# Patient Record
(demographics unavailable — no encounter records)

---

## 2024-10-21 NOTE — HISTORY OF PRESENT ILLNESS
[Lower back] : lower back [Left Leg] : left leg [Right Leg] : right leg [Work related] : work related [Frequent] : frequent [Household chores] : household chores [Work] : work [Sleep] : sleep [Meds] : meds [Extending back] : extending back [Walking] : walking [Full time] : Work status: full time [Home] : at home, [unfilled] , at the time of the visit. [Medical Office: ([facility])___] : at the medical office located in  [Verbal consent obtained from patient] : the patient, [unfilled] [] : no [FreeTextEntry1] : RT LEG GREATER THAN LT  [FreeTextEntry3] : 06/03/2010 [FreeTextEntry7] : RIGHT LEG [de-identified] : 1/2023

## 2024-11-20 NOTE — HISTORY OF PRESENT ILLNESS
[Lower back] : lower back [Work related] : work related [7] : 7 [Frequent] : frequent [Household chores] : household chores [Work] : work [Sleep] : sleep [Meds] : meds [Extending back] : extending back [Walking] : walking [Full time] : Work status: full time [Home] : at home, [unfilled] , at the time of the visit. [Medical Office: ([facility])___] : at the medical office located in  [Verbal consent obtained from patient] : the patient, [unfilled] [] : no [FreeTextEntry3] : 4/20/10 [FreeTextEntry7] : RIGHT LEG [de-identified] : 1/2023

## 2024-11-24 NOTE — ASSESSMENT
[FreeTextEntry1] : Interim history The patient is tolerating their medications without problems. There has no new pains, injuries, or complaints and no new issues. The use of medications appears appropriate and there are no aberrant behaviors noted. Side effects to current medications are denied. Average pain score for the month is 6 out of ten. The patient's current medications are documented to the best of their ability. The  was obtained and reviewed prior to the visit, and any discrepancies were discussed with the patient. Objective information Since the last visit there are no additional radiologic studies, labs, or pain complaints. There are no changes in the patient's physical status. Plan The patient was given refill of their medication at their current level and will return to the office as needed for follow-up. The patient is showing no aberrant behavior or evidence of diversion. Opioid contract and opioid risk assessment on chart.  reviewed and any discrepancy discussed with patent. Applicable urine toxicology reviewed and recorded in the patient's electronic record. Urine toxicology is ordered for patient per office protocol or patient's risk assessment.  Patient will follow-up in 1 month unless new issues arise the patient has returned earlier.  This note was generated by using Dragon medical dictation software.  A reasonable effort has been made for proofreading its contents, but typos may still remain.  If there are any questions or points of clarification needed, please notify my office.   None

## 2024-12-20 NOTE — HISTORY OF PRESENT ILLNESS
[Lower back] : lower back [Left Leg] : left leg [Right Leg] : right leg [Work related] : work related [Frequent] : frequent [Household chores] : household chores [Work] : work [Sleep] : sleep [Meds] : meds [Extending back] : extending back [Walking] : walking [Full time] : Work status: full time [] : no [FreeTextEntry1] : RT LEG GREATER THAN LT  [FreeTextEntry3] : 06/03/2010 [FreeTextEntry7] : RIGHT LEG [de-identified] : 1/2023

## 2025-01-17 NOTE — HISTORY OF PRESENT ILLNESS
[Lower back] : lower back [Work related] : work related [7] : 7 [Shooting] : shooting [Frequent] : frequent [Household chores] : household chores [Work] : work [Sleep] : sleep [Meds] : meds [Extending back] : extending back [Walking] : walking [Full time] : Work status: full time [Home] : at home, [unfilled] , at the time of the visit. [Medical Office: ([facility])___] : at the medical office located in  [Verbal consent obtained from patient] : the patient, [unfilled] [FreeTextEntry1] : HEMA MCKOY IS FOLLOWING UP FOR PAIN MED REFILL, PT ADL'S INCREASE WITH MEDICATION PT IS MORE ACTIVE IN GENERAL AND HAS IMRPOVED QUALITY OF LIFE. PT IS ABLE TO COMPLETE ADL     LAST UDS: 12.20.2024  PAIN LEVEL: 2-7/10 [] : no [FreeTextEntry3] : 4/20/10 [FreeTextEntry7] : RIGHT LEG [de-identified] : 1/2023

## 2025-02-21 NOTE — HISTORY OF PRESENT ILLNESS
[Home] : at home, [unfilled] , at the time of the visit. [Medical Office: ([facility])___] : at the medical office located in  [Telehealth (audio & video)] : This visit was provided via telehealth using real-time 2-way audio visual technology. [Verbal consent obtained from patient] : the patient, [unfilled] [FreeTextEntry1] : HEMA MCKOY IS FOLLOWING UP FOR PAIN MED REFILL, PT ADL'S INCREASE WITH MEDICATION PT IS MORE ACTIVE IN GENERAL AND HAS IMRPOVED QUALITY OF LIFE. PT IS ABLE TO COMPLETE ADL     LAST UDS: 12.20.2024  PAIN LEVEL: 2-7/10

## 2025-02-21 NOTE — ASSESSMENT
[FreeTextEntry1] : Interim history The patient is tolerating their medications without problems. There has no new pains, injuries, or complaints and no new issues. The use of medications appears appropriate and there are no aberrant behaviors noted. Side effects to current medications are denied. Average pain score for the month is 4 out of ten. The patient's current medications are documented to the best of their ability. The  was obtained and reviewed prior to the visit, and any discrepancies were discussed with the patient. Objective information Since the last visit there are no additional radiologic studies, labs, or pain complaints. There are no changes in the patient's physical status. Plan The patient was given refill of their medication at their current level and will return to the office as needed for follow-up. The patient is showing no aberrant behavior or evidence of diversion. Opioid contract and opioid risk assessment on chart.  reviewed and any discrepancy discussed with patent. Applicable urine toxicology reviewed and recorded in the patient's electronic record. Urine toxicology is ordered for patient per office protocol or patient's risk assessment.  Patient will follow-up in 1 month unless new issues arise the patient has returned earlier.  This note was generated by using Dragon medical dictation software.  A reasonable effort has been made for proofreading its contents, but typos may still remain.  If there are any questions or points of clarification needed, please notify my office.

## 2025-03-21 NOTE — HISTORY OF PRESENT ILLNESS
[Lower back] : lower back [Left Leg] : left leg [Right Leg] : right leg [Work related] : work related [Frequent] : frequent [Household chores] : household chores [Work] : work [Sleep] : sleep [Meds] : meds [Extending back] : extending back [Walking] : walking [Full time] : Work status: full time [Home] : at home, [unfilled] , at the time of the visit. [Medical Office: ([facility])___] : at the medical office located in  [Telehealth (audio & video)] : This visit was provided via telehealth using real-time 2-way audio visual technology. [Verbal consent obtained from patient] : the patient, [unfilled] [] : no [FreeTextEntry1] : RT LEG GREATER THAN LT  [FreeTextEntry3] : 06/03/2010 [FreeTextEntry7] : RIGHT LEG [de-identified] : 1/2023

## 2025-04-18 NOTE — HISTORY OF PRESENT ILLNESS
[Lower back] : lower back [Left Leg] : left leg [Right Leg] : right leg [Work related] : work related [Frequent] : frequent [Household chores] : household chores [Work] : work [Sleep] : sleep [Meds] : meds [Extending back] : extending back [Walking] : walking [Full time] : Work status: full time [] : no [FreeTextEntry1] : RT LEG GREATER THAN LT  [FreeTextEntry3] : 06/03/2010 [FreeTextEntry7] : RIGHT LEG [de-identified] : 1/2023

## 2025-04-18 NOTE — ASSESSMENT
How Severe Are Your Spot(S)?: mild [FreeTextEntry1] : Interim history The patient is tolerating their medications without problems. There has no new pains, injuries, or complaints and no new issues. The use of medications appears appropriate and there are no aberrant behaviors noted. Side effects to current medications are denied. Average pain score for the month is 6 out of ten. The patient's current medications are documented to the best of their ability. The  was obtained and reviewed prior to the visit, and any discrepancies were discussed with the patient. Objective information Since the last visit there are no additional radiologic studies, labs, or pain complaints. There are no changes in the patient's physical status. Plan The patient was given refill of their medication at their current level and will return to the office as needed for follow-up. The patient is showing no aberrant behavior or evidence of diversion. Opioid contract and opioid risk assessment on chart.  reviewed and any discrepancy discussed with patent. Applicable urine toxicology reviewed and recorded in the patient's electronic record. Urine toxicology is ordered for patient per office protocol or patient's risk assessment.  Patient will follow-up in 1 month unless new issues arise the patient has returned earlier.  This note was generated by using Dragon medical dictation software.  A reasonable effort has been made for proofreading its contents, but typos may still remain.  If there are any questions or points of clarification needed, please notify my office.   What Is The Reason For Today's Visit?: Full Body Skin Examination What Is The Reason For Today's Visit? (Being Monitored For X): concerning skin lesions on an annual basis

## 2025-05-16 NOTE — HISTORY OF PRESENT ILLNESS
[Lower back] : lower back [Work related] : work related [7] : 7 [Shooting] : shooting [Frequent] : frequent [Household chores] : household chores [Work] : work [Sleep] : sleep [Meds] : meds [Extending back] : extending back [Walking] : walking [Full time] : Work status: full time [Home] : at home, [unfilled] , at the time of the visit. [Medical Office: ([facility])___] : at the medical office located in  [Telehealth (audio & video)] : This visit was provided via telehealth using real-time 2-way audio visual technology. [Verbal consent obtained from patient] : the patient, [unfilled] [] : no [FreeTextEntry3] : 4/20/10 [FreeTextEntry7] : RIGHT LEG [de-identified] : 1/2023

## 2025-06-20 NOTE — HISTORY OF PRESENT ILLNESS
[Lower back] : lower back [Left Leg] : left leg [Right Leg] : right leg [Work related] : work related [Frequent] : frequent [Household chores] : household chores [Work] : work [Sleep] : sleep [Meds] : meds [Extending back] : extending back [Walking] : walking [Full time] : Work status: full time [Home] : at home, [unfilled] , at the time of the visit. [Medical Office: ([facility])___] : at the medical office located in  [Telehealth (audio & video)] : This visit was provided via telehealth using real-time 2-way audio visual technology. [Verbal consent obtained from patient] : the patient, [unfilled] [] : no [FreeTextEntry1] : RT LEG GREATER THAN LT  [FreeTextEntry3] : 06/03/2010 [FreeTextEntry7] : RIGHT LEG [de-identified] : 1/2023

## 2025-06-20 NOTE — ASSESSMENT
[FreeTextEntry1] : Interim history The patient is tolerating their medications without problems. There has no new pains, injuries, or complaints and no new issues. The use of medications appears appropriate and there are no aberrant behaviors noted. Side effects to current medications are denied. Average pain score for the month is 6 out of ten. The patient's current medications are documented to the best of their ability. The  was obtained and reviewed prior to the visit, and any discrepancies were discussed with the patient. Objective information Since the last visit there are no additional radiologic studies, labs, or pain complaints. There are no changes in the patient's physical status. Plan The patient was given refill of their medication at their current level and will return to the office as needed for follow-up. The patient is showing no aberrant behavior or evidence of diversion. Opioid contract and opioid risk assessment on chart.  reviewed and any discrepancy discussed with patent. Applicable urine toxicology reviewed and recorded in the patient's electronic record. Urine toxicology is ordered for patient per office protocol or patient's risk assessment.  Patient will follow-up in 1 month unless new issues arise the patient has returned earlier.  This note was generated by using Dragon medical dictation software.  A reasonable effort has been made for proofreading its contents, but typos may still remain.  If there are any questions or points of clarification needed, please notify my office.

## 2025-07-21 NOTE — HISTORY OF PRESENT ILLNESS
[Lower back] : lower back [Work related] : work related [7] : 7 [Shooting] : shooting [Frequent] : frequent [Household chores] : household chores [Work] : work [Sleep] : sleep [Meds] : meds [Extending back] : extending back [Walking] : walking [Full time] : Work status: full time [Home] : at home, [unfilled] , at the time of the visit. [Medical Office: ([facility])___] : at the medical office located in  [Telehealth (audio & video)] : This visit was provided via telehealth using real-time 2-way audio visual technology. [Verbal consent obtained from patient] : the patient, [unfilled] [] : no [FreeTextEntry3] : 4/20/10 [FreeTextEntry7] : RIGHT LEG [de-identified] : 1/2023